# Patient Record
Sex: FEMALE | Race: BLACK OR AFRICAN AMERICAN | NOT HISPANIC OR LATINO | URBAN - METROPOLITAN AREA
[De-identification: names, ages, dates, MRNs, and addresses within clinical notes are randomized per-mention and may not be internally consistent; named-entity substitution may affect disease eponyms.]

---

## 2022-04-21 ENCOUNTER — OFFICE VISIT (OUTPATIENT)
Dept: URBAN - METROPOLITAN AREA CLINIC 63 | Facility: CLINIC | Age: 72
End: 2022-04-21

## 2022-07-09 ENCOUNTER — TELEPHONE ENCOUNTER (OUTPATIENT)
Dept: URBAN - METROPOLITAN AREA CLINIC 121 | Facility: CLINIC | Age: 72
End: 2022-07-09

## 2022-07-09 RX ORDER — METFORMIN HCL 500 MG/1
TABLET ORAL ONCE A DAY
Refills: 0 | OUTPATIENT
Start: 2017-02-01 | End: 2017-07-11

## 2022-07-09 RX ORDER — PRAVASTATIN SODIUM 10 MG/1
TABLET ORAL ONCE A DAY
Refills: 0 | OUTPATIENT
Start: 2017-01-27 | End: 2017-07-11

## 2022-07-09 RX ORDER — PANTOPRAZOLE SODIUM 20 MG
TABLET, DELAYED RELEASE (ENTERIC COATED) ORAL
Refills: 0 | OUTPATIENT
Start: 2013-08-19 | End: 2017-02-01

## 2022-07-10 ENCOUNTER — TELEPHONE ENCOUNTER (OUTPATIENT)
Dept: URBAN - METROPOLITAN AREA CLINIC 121 | Facility: CLINIC | Age: 72
End: 2022-07-10

## 2022-07-10 RX ORDER — PRAVASTATIN SODIUM 10 MG/1
TABLET ORAL ONCE A DAY
Refills: 0 | Status: ACTIVE | COMMUNITY
Start: 2017-07-11

## 2022-07-10 RX ORDER — METFORMIN HCL 500 MG/1
TABLET ORAL ONCE A DAY
Refills: 0 | Status: ACTIVE | COMMUNITY
Start: 2017-07-11

## 2022-07-10 RX ORDER — OMEPRAZOLE 40 MG/1
CAPSULE, DELAYED RELEASE ORAL ONCE A DAY
Refills: 0 | Status: ACTIVE | COMMUNITY
Start: 2022-04-21

## 2022-07-10 RX ORDER — DEXLANSOPRAZOLE 60 MG/1
QD CAPSULE, DELAYED RELEASE ORAL
Refills: 5 | Status: ACTIVE | COMMUNITY
Start: 2013-09-18

## 2022-07-10 RX ORDER — ROSUVASTATIN CALCIUM 10 MG/1
TABLET, FILM COATED ORAL ONCE A DAY
Refills: 0 | Status: ACTIVE | COMMUNITY
Start: 2022-04-21

## 2022-07-10 RX ORDER — FAMOTIDINE 40 MG/1
TABLET, FILM COATED ORAL ONCE A DAY
Refills: 0 | Status: ACTIVE | COMMUNITY
Start: 2022-04-21

## 2022-07-10 RX ORDER — RAMIPRIL 1.25 MG/1
CAPSULE ORAL ONCE A DAY
Refills: 0 | Status: ACTIVE | COMMUNITY
Start: 2022-04-21

## 2022-07-10 RX ORDER — METFORMIN HCL 500 MG/1
TABLET ORAL ONCE A DAY
Refills: 0 | Status: ACTIVE | COMMUNITY
Start: 2022-04-21

## 2022-07-10 RX ORDER — AZITHROMYCIN 250 MG/1
TABLET, FILM COATED ORAL ONCE A DAY
Refills: 0 | Status: ACTIVE | COMMUNITY
Start: 2022-04-21

## 2022-07-20 ENCOUNTER — OFFICE VISIT (OUTPATIENT)
Dept: URBAN - METROPOLITAN AREA SURGERY CENTER 4 | Facility: SURGERY CENTER | Age: 72
End: 2022-07-20
Payer: MEDICARE

## 2022-07-20 ENCOUNTER — CLAIMS CREATED FROM THE CLAIM WINDOW (OUTPATIENT)
Dept: URBAN - METROPOLITAN AREA CLINIC 61 | Facility: CLINIC | Age: 72
End: 2022-07-20
Payer: MEDICARE

## 2022-07-20 DIAGNOSIS — Z86.010 COLON CANCER SCREENING (HIGH): ICD-10-CM

## 2022-07-20 DIAGNOSIS — K29.70 CHRONIC GASTRITIS: ICD-10-CM

## 2022-07-20 DIAGNOSIS — R12 HEART BURN: ICD-10-CM

## 2022-07-20 DIAGNOSIS — K64.0 GRADE I HEMORRHOIDS: ICD-10-CM

## 2022-07-20 PROCEDURE — G8907 PT DOC NO EVENTS ON DISCHARG: HCPCS | Performed by: INTERNAL MEDICINE

## 2022-07-20 PROCEDURE — G0105 COLORECTAL SCRN; HI RISK IND: HCPCS | Performed by: INTERNAL MEDICINE

## 2022-07-20 PROCEDURE — G8918 PT W/O PREOP ORDER IV AB PRO: HCPCS | Performed by: INTERNAL MEDICINE

## 2022-07-20 PROCEDURE — 43239 EGD BIOPSY SINGLE/MULTIPLE: CPT | Performed by: INTERNAL MEDICINE

## 2022-07-20 PROCEDURE — 88312 SPECIAL STAINS GROUP 1: CPT | Performed by: INTERNAL MEDICINE

## 2022-07-20 RX ORDER — DEXLANSOPRAZOLE 60 MG/1
QD CAPSULE, DELAYED RELEASE ORAL
Refills: 5 | Status: ACTIVE | COMMUNITY
Start: 2013-09-18

## 2022-07-20 RX ORDER — AZITHROMYCIN 250 MG/1
TABLET, FILM COATED ORAL ONCE A DAY
Refills: 0 | Status: ACTIVE | COMMUNITY
Start: 2022-04-21

## 2022-07-20 RX ORDER — RAMIPRIL 1.25 MG/1
CAPSULE ORAL ONCE A DAY
Refills: 0 | Status: ACTIVE | COMMUNITY
Start: 2022-04-21

## 2022-07-20 RX ORDER — METFORMIN HCL 500 MG/1
TABLET ORAL ONCE A DAY
Refills: 0 | Status: ACTIVE | COMMUNITY
Start: 2017-07-11

## 2022-07-20 RX ORDER — OMEPRAZOLE 40 MG/1
CAPSULE, DELAYED RELEASE ORAL ONCE A DAY
Refills: 0 | Status: ACTIVE | COMMUNITY
Start: 2022-04-21

## 2022-07-20 RX ORDER — PRAVASTATIN SODIUM 10 MG/1
TABLET ORAL ONCE A DAY
Refills: 0 | Status: ACTIVE | COMMUNITY
Start: 2017-07-11

## 2022-07-20 RX ORDER — ROSUVASTATIN CALCIUM 10 MG/1
TABLET, FILM COATED ORAL ONCE A DAY
Refills: 0 | Status: ACTIVE | COMMUNITY
Start: 2022-04-21

## 2022-07-20 RX ORDER — FAMOTIDINE 40 MG/1
TABLET, FILM COATED ORAL ONCE A DAY
Refills: 0 | Status: ACTIVE | COMMUNITY
Start: 2022-04-21

## 2022-08-04 ENCOUNTER — OFFICE VISIT (OUTPATIENT)
Dept: URBAN - METROPOLITAN AREA CLINIC 63 | Facility: CLINIC | Age: 72
End: 2022-08-04
Payer: MEDICARE

## 2022-08-04 ENCOUNTER — DASHBOARD ENCOUNTERS (OUTPATIENT)
Age: 72
End: 2022-08-04

## 2022-08-04 VITALS
SYSTOLIC BLOOD PRESSURE: 122 MMHG | WEIGHT: 184.2 LBS | BODY MASS INDEX: 30.69 KG/M2 | DIASTOLIC BLOOD PRESSURE: 76 MMHG | HEIGHT: 65 IN | OXYGEN SATURATION: 98 % | HEART RATE: 72 BPM | TEMPERATURE: 97.6 F

## 2022-08-04 DIAGNOSIS — Z86.010 PERSONAL HISTORY OF COLONIC POLYPS: ICD-10-CM

## 2022-08-04 DIAGNOSIS — K29.30 CHRONIC SUPERFICIAL GASTRITIS WITHOUT BLEEDING: ICD-10-CM

## 2022-08-04 DIAGNOSIS — K21.9 GERD WITHOUT ESOPHAGITIS: ICD-10-CM

## 2022-08-04 DIAGNOSIS — K64.8 INTERNAL HEMORRHOIDS: ICD-10-CM

## 2022-08-04 PROBLEM — 90458007 INTERNAL HEMORRHOIDS: Status: ACTIVE | Noted: 2022-08-04

## 2022-08-04 PROBLEM — 196735001 CSG - CHRONIC SUPERFICIAL GASTRITIS: Status: ACTIVE | Noted: 2022-08-04

## 2022-08-04 PROBLEM — 266435005: Status: ACTIVE | Noted: 2022-08-04

## 2022-08-04 PROBLEM — 428283002 HISTORY OF POLYP OF COLON (SITUATION): Status: ACTIVE | Noted: 2022-08-04

## 2022-08-04 PROCEDURE — 99213 OFFICE O/P EST LOW 20 MIN: CPT | Performed by: NURSE PRACTITIONER

## 2022-08-04 RX ORDER — FAMOTIDINE 40 MG/1
TABLET, FILM COATED ORAL ONCE A DAY
Refills: 0 | Status: ACTIVE | COMMUNITY
Start: 2022-04-21

## 2022-08-04 RX ORDER — ROSUVASTATIN CALCIUM 10 MG/1
TABLET, FILM COATED ORAL ONCE A DAY
Refills: 0 | Status: ACTIVE | COMMUNITY
Start: 2022-04-21

## 2022-08-04 RX ORDER — METFORMIN HCL 500 MG/1
TABLET ORAL ONCE A DAY
Refills: 0 | Status: ACTIVE | COMMUNITY
Start: 2017-07-11

## 2022-08-04 RX ORDER — RAMIPRIL 1.25 MG/1
CAPSULE ORAL ONCE A DAY
Refills: 0 | Status: ACTIVE | COMMUNITY
Start: 2022-04-21

## 2022-08-04 RX ORDER — METOPROLOL SUCCINATE 25 MG/1
TAKE 1/2 TABLET BY MOUTH EVERY NIGHT TABLET, EXTENDED RELEASE ORAL
Qty: 45 EACH | Refills: 0 | Status: ACTIVE | COMMUNITY

## 2022-08-04 RX ORDER — OMEPRAZOLE 40 MG/1
CAPSULE, DELAYED RELEASE ORAL ONCE A DAY
Refills: 0 | Status: ACTIVE | COMMUNITY
Start: 2022-04-21

## 2022-08-04 NOTE — HPI-TODAY'S VISIT:
Pamella is seen today on follow-up after EGD/colonoscopy procedure.  EGD revealed normal-appearing esophagus.  Mild gastritis was observed in the antrum and biopsy revealed mild chronic inactive gastritis negative for H. pylori and.  Duodenum appeared normal. Colonoscopy revealed normal-appearing large bowel mucosa throughout the entire colon.  Grade 1 internal hemorrhoids were observed. Today she is feeling well and reports no postprocedural complications. She does have some occasional GERD symptoms.  She is currently taking omeprazole 40 mg daily and occasionally using famotidine 40 mg in the evenings.  When she has breakthrough symptoms it usually in the evenings after eating and lying down.  She knows there are certain trigger foods that seem to cause more symptoms.  When she takes the famotidine she will get relief from the breakthrough symptoms.  She also uses Tums on occasion. Her bowel movements are normal without problems.

## 2022-08-11 PROBLEM — 8493009 CHRONIC GASTRITIS: Status: ACTIVE | Noted: 2022-08-11
